# Patient Record
Sex: FEMALE | Race: BLACK OR AFRICAN AMERICAN | NOT HISPANIC OR LATINO | Employment: OTHER | URBAN - METROPOLITAN AREA
[De-identification: names, ages, dates, MRNs, and addresses within clinical notes are randomized per-mention and may not be internally consistent; named-entity substitution may affect disease eponyms.]

---

## 2020-07-14 PROBLEM — H25.9 AGE-RELATED CATARACT OF BOTH EYES: Status: ACTIVE | Noted: 2020-07-14

## 2020-07-14 PROBLEM — I10 ESSENTIAL HYPERTENSION: Status: ACTIVE | Noted: 2020-07-14

## 2020-07-14 PROBLEM — H43.399 FLOATERS: Status: ACTIVE | Noted: 2020-07-14

## 2020-07-14 PROBLEM — I49.8 BIGEMINY: Status: ACTIVE | Noted: 2020-07-14

## 2021-09-16 PROBLEM — Z00.00 ROUTINE HEALTH MAINTENANCE: Status: ACTIVE | Noted: 2021-09-16

## 2021-11-24 PROBLEM — R10.13 EPIGASTRIC PAIN: Status: ACTIVE | Noted: 2021-11-24

## 2021-11-24 PROBLEM — I50.9 ACUTE ON CHRONIC HEART FAILURE: Status: ACTIVE | Noted: 2021-11-24

## 2021-11-24 PROBLEM — R06.09 DYSPNEA ON EXERTION: Status: ACTIVE | Noted: 2021-11-24

## 2021-11-24 PROBLEM — I50.43 ACUTE ON CHRONIC COMBINED SYSTOLIC AND DIASTOLIC HEART FAILURE: Status: ACTIVE | Noted: 2021-11-24

## 2021-12-20 PROBLEM — Z00.00 ROUTINE HEALTH MAINTENANCE: Status: RESOLVED | Noted: 2021-09-16 | Resolved: 2021-12-20

## 2021-12-27 PROBLEM — I50.42 CHRONIC COMBINED SYSTOLIC AND DIASTOLIC HEART FAILURE: Status: ACTIVE | Noted: 2021-11-24

## 2022-02-03 PROBLEM — I95.9 HYPOTENSION: Status: ACTIVE | Noted: 2022-02-03

## 2022-05-09 PROBLEM — Z00.00 ROUTINE HEALTH MAINTENANCE: Status: RESOLVED | Noted: 2021-09-16 | Resolved: 2022-05-09

## 2022-08-03 PROBLEM — E55.9 VITAMIN D DEFICIENCY: Status: ACTIVE | Noted: 2022-08-03

## 2022-08-03 PROBLEM — H40.1190 PRIMARY OPEN ANGLE GLAUCOMA (POAG): Status: ACTIVE | Noted: 2022-08-03

## 2023-03-30 ENCOUNTER — PES CALL (OUTPATIENT)
Dept: ADMINISTRATIVE | Facility: CLINIC | Age: 73
End: 2023-03-30

## 2023-06-09 ENCOUNTER — PATIENT OUTREACH (OUTPATIENT)
Dept: ADMINISTRATIVE | Facility: HOSPITAL | Age: 73
End: 2023-06-09

## 2023-08-23 ENCOUNTER — EXTERNAL HOSPITAL ADMISSION (OUTPATIENT)
Dept: ADMINISTRATIVE | Facility: CLINIC | Age: 73
End: 2023-08-23

## 2023-08-23 ENCOUNTER — PATIENT OUTREACH (OUTPATIENT)
Dept: ADMINISTRATIVE | Facility: CLINIC | Age: 73
End: 2023-08-23

## 2023-09-21 PROBLEM — I48.0 PAROXYSMAL ATRIAL FIBRILLATION: Status: ACTIVE | Noted: 2023-09-21

## 2023-12-04 PROBLEM — Z12.11 COLON CANCER SCREENING: Status: ACTIVE | Noted: 2021-09-16

## 2024-01-11 DIAGNOSIS — Z00.00 ENCOUNTER FOR MEDICARE ANNUAL WELLNESS EXAM: ICD-10-CM

## 2024-01-31 ENCOUNTER — PATIENT MESSAGE (OUTPATIENT)
Dept: ADMINISTRATIVE | Facility: HOSPITAL | Age: 74
End: 2024-01-31

## 2024-01-31 ENCOUNTER — PATIENT OUTREACH (OUTPATIENT)
Dept: ADMINISTRATIVE | Facility: HOSPITAL | Age: 74
End: 2024-01-31

## 2024-02-20 ENCOUNTER — SOCIAL WORK (OUTPATIENT)
Dept: ADMINISTRATIVE | Facility: OTHER | Age: 74
End: 2024-02-20

## 2024-02-20 NOTE — PROGRESS NOTES
Clinic RN notified  that pt has been denied for the PAP program for the medication Eliquis. Next steps are to have the pt apply for the low income subsidy program. If pt is denied, PAP application can be reconsidered.  placed a call to pt and received no answer. CW then placed a phone call to pt son (Walter Crooks, [phone]) to provide assistance. Pt son expressed that pt is currently hospitalized @ [facility] and has been  for approximately one week. Casworker held a conference call w/ pt son and pt and explained in great length the process of applying for low income subsidy program. CW provided contact information and address for Social Security Office. Pt and pt son verbalized understanding. Pt and pt family states they will follow up upon discharge from the hospital to initiate low income subsidy application.  also advised pt to communicate to inpatient case management team @ [facility] of needs upon discharging home for a safe discharge. Pt verbalized understanding.  will con't to assist as needed.     [facility] Office   [phone]  [address] [id number]    Dana Richardson    [phone]

## 2024-02-28 ENCOUNTER — PATIENT OUTREACH (OUTPATIENT)
Dept: ADMINISTRATIVE | Facility: CLINIC | Age: 74
End: 2024-02-28

## 2024-02-28 RX ORDER — BUMETANIDE 1 MG/1
1 TABLET ORAL 2 TIMES DAILY
COMMUNITY
Start: 2024-02-23

## 2024-02-28 NOTE — PROGRESS NOTES
C3 nurse spoke with Mireille Crooks for a TCC post hospital discharge follow up call. The patient had a FU with Mindy Cox NP (cardio) on 2/26/24 and a IBAN. She has a FU again with Mindy Cox NP (cardio) on 3/7/24 @ 8:00am.     She did note that she takes Vitamin D once daily. She will FU with cardiology regarding assistance for coverage of Eliquis. She stated that she is waiting on a letter from the  office.

## 2024-03-04 PROBLEM — Z00.00 ROUTINE HEALTH MAINTENANCE: Status: RESOLVED | Noted: 2021-09-16 | Resolved: 2024-03-04

## 2024-07-03 ENCOUNTER — PATIENT OUTREACH (OUTPATIENT)
Dept: ADMINISTRATIVE | Facility: HOSPITAL | Age: 74
End: 2024-07-03

## 2024-07-26 ENCOUNTER — PATIENT OUTREACH (OUTPATIENT)
Dept: ADMINISTRATIVE | Facility: HOSPITAL | Age: 74
End: 2024-07-26